# Patient Record
Sex: MALE | Race: WHITE | NOT HISPANIC OR LATINO | Employment: FULL TIME | URBAN - METROPOLITAN AREA
[De-identification: names, ages, dates, MRNs, and addresses within clinical notes are randomized per-mention and may not be internally consistent; named-entity substitution may affect disease eponyms.]

---

## 2018-10-09 ENCOUNTER — OFFICE VISIT CONVERTED (OUTPATIENT)
Dept: FAMILY MEDICINE CLINIC | Facility: CLINIC | Age: 43
End: 2018-10-09
Attending: NURSE PRACTITIONER

## 2019-10-16 ENCOUNTER — CONVERSION ENCOUNTER (OUTPATIENT)
Dept: OTHER | Facility: HOSPITAL | Age: 44
End: 2019-10-16

## 2019-10-16 ENCOUNTER — HOSPITAL ENCOUNTER (OUTPATIENT)
Dept: FAMILY MEDICINE CLINIC | Facility: CLINIC | Age: 44
Discharge: HOME | End: 2019-10-16
Attending: NURSE PRACTITIONER

## 2019-10-16 ENCOUNTER — OFFICE VISIT CONVERTED (OUTPATIENT)
Dept: FAMILY MEDICINE CLINIC | Facility: CLINIC | Age: 44
End: 2019-10-16
Attending: NURSE PRACTITIONER

## 2019-10-16 LAB
25(OH)D3 SERPL-MCNC: 20.5 NG/ML (ref 30–100)
ALBUMIN SERPL-MCNC: 4.3 G/DL (ref 3.5–5)
ALBUMIN/GLOB SERPL: 1.3 {RATIO} (ref 1.4–2.6)
ALP SERPL-CCNC: 84 U/L (ref 53–128)
ALT SERPL-CCNC: 17 U/L (ref 10–40)
ANION GAP SERPL CALC-SCNC: 18 MMOL/L (ref 8–19)
AST SERPL-CCNC: 20 U/L (ref 15–50)
BASOPHILS # BLD AUTO: 0.03 10*3/UL (ref 0–0.2)
BASOPHILS NFR BLD AUTO: 0.5 % (ref 0–3)
BILIRUB SERPL-MCNC: 0.52 MG/DL (ref 0.2–1.3)
BUN SERPL-MCNC: 14 MG/DL (ref 5–25)
BUN/CREAT SERPL: 12 {RATIO} (ref 6–20)
CALCIUM SERPL-MCNC: 9.8 MG/DL (ref 8.7–10.4)
CHLORIDE SERPL-SCNC: 103 MMOL/L (ref 99–111)
CHOLEST SERPL-MCNC: 191 MG/DL (ref 107–200)
CHOLEST/HDLC SERPL: 4.5 {RATIO} (ref 3–6)
CONV ABS IMM GRAN: 0.03 10*3/UL (ref 0–0.2)
CONV CO2: 25 MMOL/L (ref 22–32)
CONV IMMATURE GRAN: 0.5 % (ref 0–1.8)
CONV TOTAL PROTEIN: 7.5 G/DL (ref 6.3–8.2)
CREAT UR-MCNC: 1.21 MG/DL (ref 0.7–1.2)
DEPRECATED RDW RBC AUTO: 39.3 FL (ref 35.1–43.9)
EOSINOPHIL # BLD AUTO: 0.32 10*3/UL (ref 0–0.7)
EOSINOPHIL # BLD AUTO: 5.1 % (ref 0–7)
ERYTHROCYTE [DISTWIDTH] IN BLOOD BY AUTOMATED COUNT: 12.6 % (ref 11.6–14.4)
GFR SERPLBLD BASED ON 1.73 SQ M-ARVRAT: >60 ML/MIN/{1.73_M2}
GLOBULIN UR ELPH-MCNC: 3.2 G/DL (ref 2–3.5)
GLUCOSE SERPL-MCNC: 95 MG/DL (ref 70–99)
HCT VFR BLD AUTO: 47.1 % (ref 42–52)
HDLC SERPL-MCNC: 42 MG/DL (ref 40–60)
HGB BLD-MCNC: 16 G/DL (ref 14–18)
LDLC SERPL CALC-MCNC: 134 MG/DL (ref 70–100)
LYMPHOCYTES # BLD AUTO: 1.74 10*3/UL (ref 1–5)
LYMPHOCYTES NFR BLD AUTO: 28 % (ref 20–45)
MCH RBC QN AUTO: 29.1 PG (ref 27–31)
MCHC RBC AUTO-ENTMCNC: 34 G/DL (ref 33–37)
MCV RBC AUTO: 85.8 FL (ref 80–96)
MONOCYTES # BLD AUTO: 0.41 10*3/UL (ref 0.2–1.2)
MONOCYTES NFR BLD AUTO: 6.6 % (ref 3–10)
NEUTROPHILS # BLD AUTO: 3.69 10*3/UL (ref 2–8)
NEUTROPHILS NFR BLD AUTO: 59.3 % (ref 30–85)
NRBC CBCN: 0 % (ref 0–0.7)
OSMOLALITY SERPL CALC.SUM OF ELEC: 294 MOSM/KG (ref 273–304)
PLATELET # BLD AUTO: 320 10*3/UL (ref 130–400)
PMV BLD AUTO: 9.3 FL (ref 9.4–12.4)
POTASSIUM SERPL-SCNC: 4.4 MMOL/L (ref 3.5–5.3)
RBC # BLD AUTO: 5.49 10*6/UL (ref 4.7–6.1)
SODIUM SERPL-SCNC: 142 MMOL/L (ref 135–147)
TRIGL SERPL-MCNC: 73 MG/DL (ref 40–150)
TSH SERPL-ACNC: 3.26 M[IU]/L (ref 0.27–4.2)
VLDLC SERPL-MCNC: 15 MG/DL (ref 5–37)
WBC # BLD AUTO: 6.22 10*3/UL (ref 4.8–10.8)

## 2020-08-19 ENCOUNTER — HOSPITAL ENCOUNTER (OUTPATIENT)
Dept: LAB | Facility: HOSPITAL | Age: 45
Discharge: HOME | End: 2020-08-19
Attending: NURSE PRACTITIONER

## 2020-08-19 ENCOUNTER — OFFICE VISIT CONVERTED (OUTPATIENT)
Dept: FAMILY MEDICINE CLINIC | Facility: CLINIC | Age: 45
End: 2020-08-19
Attending: NURSE PRACTITIONER

## 2020-08-19 LAB — 25(OH)D3 SERPL-MCNC: 26.8 NG/ML (ref 30–100)

## 2020-08-20 LAB
ALBUMIN SERPL-MCNC: 4.2 G/DL (ref 3.5–5)
ALBUMIN/GLOB SERPL: 1.4 {RATIO} (ref 1.4–2.6)
ALP SERPL-CCNC: 77 U/L (ref 53–128)
ALT SERPL-CCNC: 17 U/L (ref 10–40)
ANION GAP SERPL CALC-SCNC: 20 MMOL/L (ref 8–19)
AST SERPL-CCNC: 20 U/L (ref 15–50)
BASOPHILS # BLD AUTO: 0.03 10*3/UL (ref 0–0.2)
BASOPHILS NFR BLD AUTO: 0.4 % (ref 0–3)
BILIRUB SERPL-MCNC: 0.61 MG/DL (ref 0.2–1.3)
BUN SERPL-MCNC: 15 MG/DL (ref 5–25)
BUN/CREAT SERPL: 12 {RATIO} (ref 6–20)
CALCIUM SERPL-MCNC: 9.9 MG/DL (ref 8.7–10.4)
CHLORIDE SERPL-SCNC: 104 MMOL/L (ref 99–111)
CHOLEST SERPL-MCNC: 186 MG/DL (ref 107–200)
CHOLEST/HDLC SERPL: 4.2 {RATIO} (ref 3–6)
CONV ABS IMM GRAN: 0.01 10*3/UL (ref 0–0.2)
CONV CO2: 23 MMOL/L (ref 22–32)
CONV IMMATURE GRAN: 0.1 % (ref 0–1.8)
CONV TOTAL PROTEIN: 7.1 G/DL (ref 6.3–8.2)
CREAT UR-MCNC: 1.28 MG/DL (ref 0.7–1.2)
DEPRECATED RDW RBC AUTO: 40.8 FL (ref 35.1–43.9)
EOSINOPHIL # BLD AUTO: 0.45 10*3/UL (ref 0–0.7)
EOSINOPHIL # BLD AUTO: 5.8 % (ref 0–7)
ERYTHROCYTE [DISTWIDTH] IN BLOOD BY AUTOMATED COUNT: 13 % (ref 11.6–14.4)
GFR SERPLBLD BASED ON 1.73 SQ M-ARVRAT: >60 ML/MIN/{1.73_M2}
GLOBULIN UR ELPH-MCNC: 2.9 G/DL (ref 2–3.5)
GLUCOSE SERPL-MCNC: 94 MG/DL (ref 70–99)
HCT VFR BLD AUTO: 43.9 % (ref 42–52)
HDLC SERPL-MCNC: 44 MG/DL (ref 40–60)
HGB BLD-MCNC: 14.7 G/DL (ref 14–18)
LDLC SERPL CALC-MCNC: 133 MG/DL (ref 70–100)
LYMPHOCYTES # BLD AUTO: 1.72 10*3/UL (ref 1–5)
LYMPHOCYTES NFR BLD AUTO: 22.3 % (ref 20–45)
MCH RBC QN AUTO: 29.1 PG (ref 27–31)
MCHC RBC AUTO-ENTMCNC: 33.5 G/DL (ref 33–37)
MCV RBC AUTO: 86.9 FL (ref 80–96)
MONOCYTES # BLD AUTO: 0.43 10*3/UL (ref 0.2–1.2)
MONOCYTES NFR BLD AUTO: 5.6 % (ref 3–10)
NEUTROPHILS # BLD AUTO: 5.06 10*3/UL (ref 2–8)
NEUTROPHILS NFR BLD AUTO: 65.8 % (ref 30–85)
NRBC CBCN: 0 % (ref 0–0.7)
OSMOLALITY SERPL CALC.SUM OF ELEC: 297 MOSM/KG (ref 273–304)
PLATELET # BLD AUTO: 304 10*3/UL (ref 130–400)
PMV BLD AUTO: 9.3 FL (ref 9.4–12.4)
POTASSIUM SERPL-SCNC: 4.2 MMOL/L (ref 3.5–5.3)
PSA SERPL-MCNC: 0.9 NG/ML (ref 0–4)
RBC # BLD AUTO: 5.05 10*6/UL (ref 4.7–6.1)
SODIUM SERPL-SCNC: 143 MMOL/L (ref 135–147)
TRIGL SERPL-MCNC: 45 MG/DL (ref 40–150)
TSH SERPL-ACNC: 2.41 M[IU]/L (ref 0.27–4.2)
VLDLC SERPL-MCNC: 9 MG/DL (ref 5–37)
WBC # BLD AUTO: 7.7 10*3/UL (ref 4.8–10.8)

## 2021-05-13 NOTE — PROGRESS NOTES
Progress Note      Patient Name: Morris Nichols   Patient ID: [id number]   Sex: Male   YOB: 1975    Primary Care Provider: Madhavi GOMES    Visit Date: 2020    Provider: MIREYA Lee   Location: [facility]   Location Address: [address] [place] [address]  [id number]   Location Phone: [phone]          Chief Complaint  · Annual Physical Exam  · Routine labs  · Medication refills      History Of Present Illness  Morris Nichols is a 45 year old /White male who presents for evaluation and treatment of:      Annual physical exam    follow up vitamin d deficiency, hyperlipidemia, and erectile dysfunction   Routine labs- fasting this morning    Medication refills- would like scripts printed out    nonsmoker  no etoh  no regular exercise   at [facility]     colonscopy age 41 recommended repeat in 5 years  father colon cancer age 53  and prostate cancer       Past Medical History  Disease Name Date Onset Notes   Allergies --  --    Anxiety --  --    Arthritis --  --    Depression --  --    Hernia --  --          Past Surgical History  Procedure Name Date Notes   Inguinal Hernia Repair --  --    Vasectomy --  --          Medication List  Name Date Started Instructions   Viagra 100 mg oral tablet 2020 take 1 tablet (100 mg) by oral route once daily as needed approximately 1 hour before sexual activity for 30 days   Vitamin D2 1,250 mcg (50,000 unit) oral capsule 2020 take 1 capsule (50,000 unit) by oral route once weekly for 90 days         Allergy List  Allergen Name Date Reaction Notes   Mobic --  --  --        Allergies Reconciled  Family Medical History  Disease Name Relative/Age Notes   Stroke  --    Heart Disease  --    Cancer, Unspecified  --          Social History  Finding Status Start/Stop Quantity Notes   Tobacco Former --/-- --  --          Immunizations  NameDate Admin Mfg Trade Name Lot Number  "Route Inj VIS Given VIS Publication   Elignzybo10/16/2019 PMC Fluzone Quadrivalent [id number] IM LD 10/16/2019    Comments: NDC [id number]   Hthnoptqs68/09/2018 PMC Fluzone Quadrivalent [id number] IM LD 10/09/2018 08/07/2015   Comments: Pt tolerated well   Tdap09/27/2019 SKB BOOSTRIX [id number] IM RD 09/27/2019    Comments: pt tolerated well. NDC: 46401-667-71         Review of Systems  · Constitutional  o Denies  o : fever, chills  · Eyes  o Denies  o : changes in vision  · HENT  o Denies  o : headaches  · Cardiovascular  o Denies  o : chest pain  · Respiratory  o Denies  o : cough  · Gastrointestinal  o Denies  o : nausea, vomiting, diarrhea, constipation, abdominal pain, blood in stools  · Genitourinary  o Denies  o : dysuria  · Musculoskeletal  o Denies  o : joint pain, joint swelling, muscle pain      Vitals  Date Time BP Position Site L\R Cuff Size HR RR TEMP (F) WT  HT  BMI kg/m2 BSA m2 O2 Sat        10/16/2019 10:52 /88 Sitting    69 - R   209lbs 2oz 5'  7.25\" 32.51 2.12 100 %    10/16/2019 11:30 /88 Sitting               08/19/2020 10:50 /87 Sitting    74 - R  98.2 203lbs 4oz 5'  7.25\" 31.6 2.09 98 %          Physical Examination  · Constitutional  o Appearance  o : well-nourished, in no acute distress  · Eyes  o Conjunctivae  o : conjunctivae normal  o Sclerae  o : sclerae white  o Pupils and Irises  o : pupils equal and round  o Eyelids/Ocular Adnexae  o : eyelid appearance normal, no exudates present  · Ears, Nose, Mouth and Throat  o Ears  o :   § External Ears  § : external auditory canal appearance within normal limits  § Otoscopic Examination  § : tympanic membrane appearance within normal limits bilaterally  o Nose  o :   § External Nose  § : appearance normal  § Intranasal Exam  § : mucosa within normal limits  § Nasopharynx  § : no discharge present  o Oral Cavity  o :   § Oral Mucosa  § : oral mucosa normal  § Lips  § : lip appearance normal  § Teeth  § : normal dentation for " age  o Throat  o :   § Oropharynx  § : no inflammation or lesions present, tonsils within normal limits  · Neck  o Inspection/Palpation  o : normal appearance, no masses or tenderness, trachea midline  o Thyroid  o : gland size normal, nontender  · Respiratory  o Respiratory Effort  o : breathing unlabored  o Inspection of Chest  o : normal appearance  o Auscultation of Lungs  o : normal breath sounds throughout inspiration and expiration  · Cardiovascular  o Heart  o :   § Auscultation of Heart  § : regular rate and rhythm, no murmurs  o Peripheral Vascular System  o :   § Carotid Arteries  § : no bruits present  § Pedal Pulses  § : pulses 2 bilaterally  § Extremities  § : no clubbing or edema  · Gastrointestinal  o Abdominal Examination  o : abdomen nontender to palpation, bowel sounds present   · Lymphatic  o Neck  o : no lymphadenopathy present  · Skin and Subcutaneous Tissue  o General Inspection  o : pink, warm, dry   · Neurologic  o Mental Status Examination  o :   § Orientation  § : grossly oriented to person, place and time  o Gait and Station  o : normal gait, able to stand without difficulty  · Psychiatric  o Judgement and Insight  o : judgment and insight intact  o Mood and Affect  o : mood normal, affect appropriate          Assessment  · Annual physical exam     V70.0/Z00.00  · Hyperlipidemia     272.4/E78.5  · Other long term (current) drug therapy     V58.69/Z79.899  · Vitamin D deficiency     268.9/E55.9  · Screening for prostate cancer     V76.44/Z12.5  · Erectile dysfunction     607.84/N52.9    Problems Reconciled  Plan  · Orders  o Male Physical Primary Care Panel (CMP, CBC, TSH, Lipid, PSA) [facility] (20626, 42142, 06006, 10464, 99947, ) - V70.0/Z00.00, V76.44/Z12.5 - 08/19/2020  o Vitamin D (25-Hydroxy) Level (17845) - 268.9/E55.9 - 08/19/2020  o ACO-39: Current medications updated and reviewed () - - 08/19/2020  · Medications  o Viagra 100 mg oral tablet   SIG: take 1 tablet (100 mg) by  oral route once daily as needed approximately 1 hour before sexual activity for 30 days   DISP: (30) tablets with 5 refills  Refilled on 08/19/2020     o Vitamin D2 50,000 unit oral capsule   SIG: take 1 capsule (50,000 unit) by oral route once weekly for 90 days   DISP: (13) capsules with 1 refills  Refilled on 08/19/2020     o Medications have been Reconciled  o Transition of Care or Provider Policy  · Instructions  o Reviewed health maintenance flowsheet and updated information. Orders were placed and/or patient's response was documented.  o Patient was educated/instructed on their diagnosis, treatment and medications prior to discharge from the clinic today.  · Disposition  o will contact with diagnostics results  o FOLLOW UP PENDING RESULTS            Electronically Signed by: MIREYA Lee -Author on August 19, 2020 11:22:38 AM

## 2021-05-15 VITALS
HEIGHT: 67 IN | BODY MASS INDEX: 31.9 KG/M2 | OXYGEN SATURATION: 98 % | HEART RATE: 74 BPM | SYSTOLIC BLOOD PRESSURE: 138 MMHG | WEIGHT: 203.25 LBS | TEMPERATURE: 98.2 F | DIASTOLIC BLOOD PRESSURE: 87 MMHG

## 2021-05-15 VITALS
HEIGHT: 67 IN | OXYGEN SATURATION: 100 % | DIASTOLIC BLOOD PRESSURE: 88 MMHG | SYSTOLIC BLOOD PRESSURE: 142 MMHG | BODY MASS INDEX: 32.82 KG/M2 | HEART RATE: 69 BPM | WEIGHT: 209.12 LBS

## 2021-05-15 VITALS — DIASTOLIC BLOOD PRESSURE: 88 MMHG | SYSTOLIC BLOOD PRESSURE: 138 MMHG

## 2021-05-16 VITALS
DIASTOLIC BLOOD PRESSURE: 83 MMHG | HEART RATE: 98 BPM | TEMPERATURE: 98.1 F | RESPIRATION RATE: 19 BRPM | WEIGHT: 216 LBS | BODY MASS INDEX: 33.9 KG/M2 | SYSTOLIC BLOOD PRESSURE: 153 MMHG | OXYGEN SATURATION: 98 % | HEIGHT: 67 IN

## 2021-06-24 ENCOUNTER — OFFICE VISIT (OUTPATIENT)
Dept: FAMILY MEDICINE CLINIC | Facility: CLINIC | Age: 46
End: 2021-06-24

## 2021-06-24 VITALS
OXYGEN SATURATION: 99 % | TEMPERATURE: 96.7 F | BODY MASS INDEX: 31.23 KG/M2 | HEIGHT: 67 IN | HEART RATE: 75 BPM | DIASTOLIC BLOOD PRESSURE: 86 MMHG | WEIGHT: 199 LBS | SYSTOLIC BLOOD PRESSURE: 126 MMHG

## 2021-06-24 DIAGNOSIS — N52.9 ERECTILE DYSFUNCTION, UNSPECIFIED ERECTILE DYSFUNCTION TYPE: ICD-10-CM

## 2021-06-24 DIAGNOSIS — Z80.0 FAMILY HISTORY OF COLON CANCER: ICD-10-CM

## 2021-06-24 DIAGNOSIS — E78.5 HYPERLIPIDEMIA, UNSPECIFIED HYPERLIPIDEMIA TYPE: Primary | ICD-10-CM

## 2021-06-24 DIAGNOSIS — Z12.11 SCREENING FOR COLON CANCER: ICD-10-CM

## 2021-06-24 DIAGNOSIS — E55.9 VITAMIN D DEFICIENCY: ICD-10-CM

## 2021-06-24 PROBLEM — F41.9 ANXIETY: Status: ACTIVE | Noted: 2021-06-24

## 2021-06-24 PROBLEM — J01.80 OTHER ACUTE SINUSITIS: Status: ACTIVE | Noted: 2021-06-24

## 2021-06-24 PROBLEM — K46.9 ABDOMINAL HERNIA: Status: ACTIVE | Noted: 2021-06-24

## 2021-06-24 PROBLEM — F32.A DEPRESSION: Status: ACTIVE | Noted: 2021-06-24

## 2021-06-24 PROBLEM — M19.90 ARTHRITIS: Status: ACTIVE | Noted: 2021-06-24

## 2021-06-24 LAB
ALBUMIN SERPL-MCNC: 4.6 G/DL (ref 3.5–5.2)
ALBUMIN/GLOB SERPL: 1.6 G/DL
ALP SERPL-CCNC: 92 U/L (ref 39–117)
ALT SERPL W P-5'-P-CCNC: 13 U/L (ref 1–41)
ANION GAP SERPL CALCULATED.3IONS-SCNC: 8.4 MMOL/L (ref 5–15)
AST SERPL-CCNC: 19 U/L (ref 1–40)
BASOPHILS # BLD AUTO: 0.04 10*3/MM3 (ref 0–0.2)
BASOPHILS NFR BLD AUTO: 0.7 % (ref 0–1.5)
BILIRUB SERPL-MCNC: 0.7 MG/DL (ref 0–1.2)
BUN SERPL-MCNC: 10 MG/DL (ref 6–20)
BUN/CREAT SERPL: 8.1 (ref 7–25)
CALCIUM SPEC-SCNC: 9.3 MG/DL (ref 8.6–10.5)
CHLORIDE SERPL-SCNC: 105 MMOL/L (ref 98–107)
CHOLEST SERPL-MCNC: 160 MG/DL (ref 0–200)
CO2 SERPL-SCNC: 26.6 MMOL/L (ref 22–29)
CREAT SERPL-MCNC: 1.24 MG/DL (ref 0.76–1.27)
DEPRECATED RDW RBC AUTO: 40.1 FL (ref 37–54)
EOSINOPHIL # BLD AUTO: 0.39 10*3/MM3 (ref 0–0.4)
EOSINOPHIL NFR BLD AUTO: 6.8 % (ref 0.3–6.2)
ERYTHROCYTE [DISTWIDTH] IN BLOOD BY AUTOMATED COUNT: 13 % (ref 12.3–15.4)
GFR SERPL CREATININE-BSD FRML MDRD: 63 ML/MIN/1.73
GLOBULIN UR ELPH-MCNC: 2.9 GM/DL
GLUCOSE SERPL-MCNC: 98 MG/DL (ref 65–99)
HCT VFR BLD AUTO: 46.5 % (ref 37.5–51)
HDLC SERPL-MCNC: 40 MG/DL (ref 40–60)
HGB BLD-MCNC: 15.6 G/DL (ref 13–17.7)
IMM GRANULOCYTES # BLD AUTO: 0.01 10*3/MM3 (ref 0–0.05)
IMM GRANULOCYTES NFR BLD AUTO: 0.2 % (ref 0–0.5)
LDLC SERPL CALC-MCNC: 106 MG/DL (ref 0–100)
LDLC/HDLC SERPL: 2.64 {RATIO}
LYMPHOCYTES # BLD AUTO: 1.64 10*3/MM3 (ref 0.7–3.1)
LYMPHOCYTES NFR BLD AUTO: 28.6 % (ref 19.6–45.3)
MCH RBC QN AUTO: 28.9 PG (ref 26.6–33)
MCHC RBC AUTO-ENTMCNC: 33.5 G/DL (ref 31.5–35.7)
MCV RBC AUTO: 86.1 FL (ref 79–97)
MONOCYTES # BLD AUTO: 0.43 10*3/MM3 (ref 0.1–0.9)
MONOCYTES NFR BLD AUTO: 7.5 % (ref 5–12)
NEUTROPHILS NFR BLD AUTO: 3.23 10*3/MM3 (ref 1.7–7)
NEUTROPHILS NFR BLD AUTO: 56.2 % (ref 42.7–76)
NRBC BLD AUTO-RTO: 0 /100 WBC (ref 0–0.2)
PLATELET # BLD AUTO: 336 10*3/MM3 (ref 140–450)
PMV BLD AUTO: 9.4 FL (ref 6–12)
POTASSIUM SERPL-SCNC: 4.6 MMOL/L (ref 3.5–5.2)
PROT SERPL-MCNC: 7.5 G/DL (ref 6–8.5)
RBC # BLD AUTO: 5.4 10*6/MM3 (ref 4.14–5.8)
SODIUM SERPL-SCNC: 140 MMOL/L (ref 136–145)
TRIGL SERPL-MCNC: 72 MG/DL (ref 0–150)
TSH SERPL DL<=0.05 MIU/L-ACNC: 2.7 UIU/ML (ref 0.27–4.2)
VLDLC SERPL-MCNC: 14 MG/DL (ref 5–40)
WBC # BLD AUTO: 5.74 10*3/MM3 (ref 3.4–10.8)

## 2021-06-24 PROCEDURE — 85025 COMPLETE CBC W/AUTO DIFF WBC: CPT | Performed by: NURSE PRACTITIONER

## 2021-06-24 PROCEDURE — 80053 COMPREHEN METABOLIC PANEL: CPT | Performed by: NURSE PRACTITIONER

## 2021-06-24 PROCEDURE — 36415 COLL VENOUS BLD VENIPUNCTURE: CPT | Performed by: NURSE PRACTITIONER

## 2021-06-24 PROCEDURE — 80061 LIPID PANEL: CPT | Performed by: NURSE PRACTITIONER

## 2021-06-24 PROCEDURE — 84443 ASSAY THYROID STIM HORMONE: CPT | Performed by: NURSE PRACTITIONER

## 2021-06-24 PROCEDURE — 99214 OFFICE O/P EST MOD 30 MIN: CPT | Performed by: NURSE PRACTITIONER

## 2021-06-24 RX ORDER — ERGOCALCIFEROL 1.25 MG/1
50000 CAPSULE ORAL WEEKLY
COMMUNITY
End: 2021-06-24

## 2021-06-24 RX ORDER — SILDENAFIL 100 MG/1
100 TABLET, FILM COATED ORAL AS NEEDED
Qty: 30 TABLET | Refills: 1 | Status: SHIPPED | OUTPATIENT
Start: 2021-06-24 | End: 2021-06-24 | Stop reason: SDUPTHER

## 2021-06-24 RX ORDER — SILDENAFIL 100 MG/1
100 TABLET, FILM COATED ORAL AS NEEDED
Qty: 30 TABLET | Refills: 1 | Status: SHIPPED | OUTPATIENT
Start: 2021-06-24 | End: 2022-06-30 | Stop reason: SDUPTHER

## 2021-06-24 NOTE — PROGRESS NOTES
Follow Up Office Visit      Patient Name: Morris Nichols  : 1975   MRN: [id number]     Chief Complaint:    Chief Complaint   Patient presents with   • Med Refill   • Follow-up   • Hyperlipidemia   • Erectile Dysfunction   • Vitamin D Deficiency       History of Present Illness: Morris Nichols is a 46 y.o. male who is here today to follow up for   Hyperlipidemia, vitamin d deficiency, and erectile dysfunction   F/U-Med refills,labs     Colonscopy- 5 years ago due for one father passed from colon cancer   Labs-   Nonsmoker  No etoh  No regular exercise   at [facility]     Subjective      Review of Systems:   Review of Systems   Constitutional: Negative for chills and fever.   HENT: Negative for postnasal drip, rhinorrhea and sore throat.    Eyes: Negative for discharge.   Respiratory: Negative for cough and shortness of breath.    Cardiovascular: Negative for chest pain and leg swelling.   Gastrointestinal: Negative for abdominal pain, constipation, diarrhea, nausea and vomiting.   Genitourinary: Negative for dysuria.   Musculoskeletal: Negative for myalgias.   Skin: Negative for rash.        Past Medical History:   Past Medical History:   Diagnosis Date   • Allergies    • Anxiety    • Arthritis    • Depression    • H/O hernia repair        Past Surgical History:   Past Surgical History:   Procedure Laterality Date   • INGUINAL HERNIA REPAIR     • VESICOSTOMY         Family History:   Family History   Problem Relation Age of Onset   • Stroke Other    • Heart disease Other    • Cancer Other         unspecified       Social History:   Social History     Socioeconomic History   • Marital status:      Spouse name: Not on file   • Number of children: Not on file   • Years of education: Not on file   • Highest education level: Not on file   Tobacco Use   • Smoking status: Former Smoker   • Smokeless tobacco: Never Used   Substance and Sexual Activity   • Alcohol use: Never   • Drug use: Never  "  • Sexual activity: Defer       Medications:     Current Outpatient Medications:   •  sildenafil (VIAGRA) 100 MG tablet, Take 1 tablet by mouth As Needed for Erectile Dysfunction., Disp: 30 tablet, Rfl: 1  •  Cholecalciferol 50 MCG (2000 UT) tablet, Take 2,000 Units by mouth Daily., Disp: 90 each, Rfl: 1    Allergies:   Allergies   Allergen Reactions   • Meloxicam Unknown - High Severity           PHQ-2 Total Score: 0   PHQ-9 Total Score: 0     Objective     Physical Exam:  Vital Signs:   Vitals:    06/24/21 0856   BP: 126/86   Pulse: 75   Temp: 96.7 °F (35.9 °C)   SpO2: 99%   Weight: 90.3 kg (199 lb)   Height: 170.2 cm (67\")     Body mass index is 31.17 kg/m².     Physical Exam  Constitutional:       Appearance: Normal appearance.   HENT:      Head: Normocephalic.      Right Ear: Tympanic membrane normal.      Left Ear: Tympanic membrane normal.      Nose: Nose normal.      Mouth/Throat:      Mouth: Mucous membranes are moist.      Pharynx: Oropharynx is clear.   Eyes:      Conjunctiva/sclera: Conjunctivae normal.      Pupils: Pupils are equal, round, and reactive to light.   Neck:      Vascular: No carotid bruit.   Cardiovascular:      Rate and Rhythm: Normal rate and regular rhythm.      Heart sounds: Normal heart sounds. No murmur heard.     Pulmonary:      Effort: Pulmonary effort is normal.      Breath sounds: Normal breath sounds.   Abdominal:      General: Abdomen is flat. Bowel sounds are normal.      Palpations: Abdomen is soft.   Musculoskeletal:      Cervical back: Neck supple.      Right lower leg: No edema.      Left lower leg: No edema.   Neurological:      Mental Status: He is alert and oriented to person, place, and time.      Gait: Gait normal.   Psychiatric:         Mood and Affect: Mood normal.         Behavior: Behavior normal.             Assessment / Plan      Assessment/Plan:   Diagnoses and all orders for this visit:    1. Hyperlipidemia, unspecified hyperlipidemia type (Primary)  -     " "Comprehensive Metabolic Panel  -     CBC Auto Differential  -     Lipid Panel; Future  -     TSH    2. Screening for colon cancer  -     Ambulatory Referral For Screening Colonoscopy    3. Erectile dysfunction, unspecified erectile dysfunction type    4. Vitamin D deficiency    5. Family history of colon cancer  -     Ambulatory Referral For Screening Colonoscopy    Other orders  -     sildenafil (VIAGRA) 100 MG tablet; Take 1 tablet by mouth As Needed for Erectile Dysfunction.  Dispense: 30 tablet; Refill: 1  -     Cholecalciferol 50 MCG (2000 UT) tablet; Take 2,000 Units by mouth Daily.  Dispense: 90 each; Refill: 1       Hyperlipidemia will obtain lipid panel to monitor  ED stable on prn use orf viagra  Vitamin d deficiency recommend daily supplementation  Recommend daily exercise       Follow Up:   Return in about 1 year (around 6/24/2022).    Irving Uriostegui, APRAI    \"Please note that portions of this note were completed with a voice recognition program.\"    "

## 2021-07-09 ENCOUNTER — PREP FOR SURGERY (OUTPATIENT)
Dept: OTHER | Facility: HOSPITAL | Age: 46
End: 2021-07-09

## 2021-07-09 ENCOUNTER — CLINICAL SUPPORT (OUTPATIENT)
Dept: GASTROENTEROLOGY | Facility: CLINIC | Age: 46
End: 2021-07-09

## 2021-07-09 DIAGNOSIS — Z12.11 SCREEN FOR COLON CANCER: Primary | ICD-10-CM

## 2021-07-09 RX ORDER — MELATONIN
2000 DAILY
COMMUNITY
Start: 2021-06-24 | End: 2022-06-30 | Stop reason: SDUPTHER

## 2021-07-09 RX ORDER — PEG-3350, SODIUM SULFATE, SODIUM CHLORIDE, POTASSIUM CHLORIDE, SODIUM ASCORBATE AND ASCORBIC ACID 7.5-2.691G
1000 KIT ORAL EVERY 12 HOURS
Qty: 1000 ML | Refills: 0 | Status: ON HOLD | OUTPATIENT
Start: 2021-07-09 | End: 2021-12-29

## 2021-08-10 ENCOUNTER — TELEPHONE (OUTPATIENT)
Dept: GASTROENTEROLOGY | Facility: CLINIC | Age: 46
End: 2021-08-10

## 2021-11-03 ENCOUNTER — TELEPHONE (OUTPATIENT)
Dept: GASTROENTEROLOGY | Facility: CLINIC | Age: 46
End: 2021-11-03

## 2021-11-10 ENCOUNTER — TELEPHONE (OUTPATIENT)
Dept: GASTROENTEROLOGY | Facility: CLINIC | Age: 46
End: 2021-11-10

## 2021-11-17 ENCOUNTER — TELEPHONE (OUTPATIENT)
Dept: GASTROENTEROLOGY | Facility: CLINIC | Age: 46
End: 2021-11-17

## 2021-12-14 ENCOUNTER — TELEPHONE (OUTPATIENT)
Dept: GASTROENTEROLOGY | Facility: CLINIC | Age: 46
End: 2021-12-14

## 2021-12-14 NOTE — TELEPHONE ENCOUNTER
"LVM for patient to call our office back. Referrals department said that there is no referral on [employer] website. Sirisha to call MD office to figure out why the Referral is \"incomplete\" on [employer] website.  "

## 2021-12-22 ENCOUNTER — TELEPHONE (OUTPATIENT)
Dept: GASTROENTEROLOGY | Facility: CLINIC | Age: 46
End: 2021-12-22

## 2021-12-22 NOTE — TELEPHONE ENCOUNTER
I have called patient and left a detailed message for an upcoming procedure including date, time and a good callback number for any questions.

## 2021-12-29 ENCOUNTER — ANESTHESIA (OUTPATIENT)
Dept: GASTROENTEROLOGY | Facility: HOSPITAL | Age: 46
End: 2021-12-29

## 2021-12-29 ENCOUNTER — ANESTHESIA EVENT (OUTPATIENT)
Dept: GASTROENTEROLOGY | Facility: HOSPITAL | Age: 46
End: 2021-12-29

## 2021-12-29 ENCOUNTER — HOSPITAL ENCOUNTER (OUTPATIENT)
Facility: HOSPITAL | Age: 46
Setting detail: HOSPITAL OUTPATIENT SURGERY
Discharge: HOME | End: 2021-12-29
Attending: INTERNAL MEDICINE | Admitting: INTERNAL MEDICINE

## 2021-12-29 VITALS
DIASTOLIC BLOOD PRESSURE: 81 MMHG | BODY MASS INDEX: 30.2 KG/M2 | WEIGHT: 199.3 LBS | TEMPERATURE: 97.2 F | OXYGEN SATURATION: 97 % | HEART RATE: 69 BPM | RESPIRATION RATE: 17 BRPM | HEIGHT: 68 IN | SYSTOLIC BLOOD PRESSURE: 117 MMHG

## 2021-12-29 DIAGNOSIS — Z12.11 SCREEN FOR COLON CANCER: ICD-10-CM

## 2021-12-29 PROCEDURE — 45385 COLONOSCOPY W/LESION REMOVAL: CPT | Performed by: INTERNAL MEDICINE

## 2021-12-29 PROCEDURE — 88305 TISSUE EXAM BY PATHOLOGIST: CPT | Performed by: INTERNAL MEDICINE

## 2021-12-29 PROCEDURE — 25010000002 PROPOFOL 10 MG/ML EMULSION: Performed by: NURSE ANESTHETIST, CERTIFIED REGISTERED

## 2021-12-29 RX ORDER — SODIUM CHLORIDE, SODIUM LACTATE, POTASSIUM CHLORIDE, CALCIUM CHLORIDE 600; 310; 30; 20 MG/100ML; MG/100ML; MG/100ML; MG/100ML
30 INJECTION, SOLUTION INTRAVENOUS CONTINUOUS
Status: DISCONTINUED | OUTPATIENT
Start: 2021-12-29 | End: 2021-12-29 | Stop reason: HOSPADM

## 2021-12-29 RX ORDER — LIDOCAINE HYDROCHLORIDE 20 MG/ML
INJECTION, SOLUTION INFILTRATION; PERINEURAL AS NEEDED
Status: DISCONTINUED | OUTPATIENT
Start: 2021-12-29 | End: 2021-12-29 | Stop reason: SURG

## 2021-12-29 RX ADMIN — PROPOFOL 200 MCG/KG/MIN: 10 INJECTION, EMULSION INTRAVENOUS at 08:20

## 2021-12-29 RX ADMIN — LIDOCAINE HYDROCHLORIDE 50 MG: 20 INJECTION, SOLUTION INFILTRATION; PERINEURAL at 08:20

## 2021-12-29 RX ADMIN — SODIUM CHLORIDE, POTASSIUM CHLORIDE, SODIUM LACTATE AND CALCIUM CHLORIDE 30 ML/HR: 600; 310; 30; 20 INJECTION, SOLUTION INTRAVENOUS at 07:21

## 2021-12-29 NOTE — ANESTHESIA POSTPROCEDURE EVALUATION
Patient: Morris Nichols    Procedure Summary     Date: 12/29/21 Room / Location: [facility] ENDOSCOPY 3 / [facility] ENDOSCOPY    Anesthesia Start: 0820 Anesthesia Stop: 0842    Procedure: COLONOSCOPY with polypectomy, snare (N/A ) Diagnosis:       Screen for colon cancer      (Screen for colon cancer [Z12.11])    Surgeons: Cecy Gibson MD Provider: Augusto Hill MD    Anesthesia Type: general ASA Status: 2          Anesthesia Type: general    Vitals  Vitals Value Taken Time   BP 96/66 12/29/21 0848   Temp 36.2 °C (97.2 °F) 12/29/21 0843   Pulse 78 12/29/21 0853   Resp 12 12/29/21 0848   SpO2 96 % 12/29/21 0853   Vitals shown include unvalidated device data.        Post Anesthesia Care and Evaluation    Patient location during evaluation: bedside  Patient participation: complete - patient participated  Level of consciousness: awake  Pain management: adequate  Airway patency: patent  Anesthetic complications: No anesthetic complications  PONV Status: none  Cardiovascular status: acceptable and stable  Respiratory status: acceptable  Hydration status: acceptable    Comments: An Anesthesiologist personally participated in the most demanding procedures (including induction and emergence if applicable) in the anesthesia plan, monitored the course of anesthesia administration at frequent intervals and remained physically present and available for immediate diagnosis and treatment of emergencies.

## 2021-12-29 NOTE — ANESTHESIA PREPROCEDURE EVALUATION
Anesthesia Evaluation     Patient summary reviewed and Nursing notes reviewed   no history of anesthetic complications:  NPO Solid Status: > 8 hours  NPO Liquid Status: > 2 hours           Airway   Mallampati: I  TM distance: >3 FB  Neck ROM: full  No difficulty expected  Dental      Pulmonary - negative pulmonary ROS and normal exam    breath sounds clear to auscultation  Cardiovascular - normal exam  Exercise tolerance: good (4-7 METS)    Rhythm: regular    (+) hyperlipidemia,       Neuro/Psych  (+) psychiatric history Anxiety and Depression,     GI/Hepatic/Renal/Endo - negative ROS     Musculoskeletal     Abdominal    Substance History - negative use     OB/GYN negative ob/gyn ROS         Other   arthritis,                      Anesthesia Plan    ASA 2     general   total IV anesthesia    Anesthetic plan, all risks, benefits, and alternatives have been provided, discussed and informed consent has been obtained with: patient.

## 2021-12-30 LAB
CYTO UR: NORMAL
LAB AP CASE REPORT: NORMAL
LAB AP CLINICAL INFORMATION: NORMAL
PATH REPORT.FINAL DX SPEC: NORMAL
PATH REPORT.GROSS SPEC: NORMAL

## 2022-06-30 ENCOUNTER — OFFICE VISIT (OUTPATIENT)
Dept: FAMILY MEDICINE CLINIC | Facility: CLINIC | Age: 47
End: 2022-06-30

## 2022-06-30 VITALS
SYSTOLIC BLOOD PRESSURE: 136 MMHG | HEART RATE: 74 BPM | HEIGHT: 68 IN | TEMPERATURE: 97.6 F | OXYGEN SATURATION: 100 % | BODY MASS INDEX: 30.31 KG/M2 | WEIGHT: 200 LBS | DIASTOLIC BLOOD PRESSURE: 86 MMHG

## 2022-06-30 DIAGNOSIS — E78.2 MIXED HYPERLIPIDEMIA: ICD-10-CM

## 2022-06-30 DIAGNOSIS — N52.9 ERECTILE DYSFUNCTION, UNSPECIFIED ERECTILE DYSFUNCTION TYPE: ICD-10-CM

## 2022-06-30 DIAGNOSIS — E55.9 VITAMIN D DEFICIENCY: ICD-10-CM

## 2022-06-30 DIAGNOSIS — Z13.29 SCREENING FOR THYROID DISORDER: ICD-10-CM

## 2022-06-30 DIAGNOSIS — Z00.00 ANNUAL PHYSICAL EXAM: ICD-10-CM

## 2022-06-30 LAB
ALBUMIN SERPL-MCNC: 4.5 G/DL (ref 3.5–5.2)
ALBUMIN/GLOB SERPL: 1.8 G/DL
ALP SERPL-CCNC: 93 U/L (ref 39–117)
ALT SERPL W P-5'-P-CCNC: 13 U/L (ref 1–41)
ANION GAP SERPL CALCULATED.3IONS-SCNC: 8.9 MMOL/L (ref 5–15)
AST SERPL-CCNC: 14 U/L (ref 1–40)
BASOPHILS # BLD AUTO: 0.04 10*3/MM3 (ref 0–0.2)
BASOPHILS NFR BLD AUTO: 0.7 % (ref 0–1.5)
BILIRUB SERPL-MCNC: 0.7 MG/DL (ref 0–1.2)
BUN SERPL-MCNC: 14 MG/DL (ref 6–20)
BUN/CREAT SERPL: 11.7 (ref 7–25)
CALCIUM SPEC-SCNC: 9.1 MG/DL (ref 8.6–10.5)
CHLORIDE SERPL-SCNC: 104 MMOL/L (ref 98–107)
CHOLEST SERPL-MCNC: 166 MG/DL (ref 0–200)
CO2 SERPL-SCNC: 26.1 MMOL/L (ref 22–29)
CREAT SERPL-MCNC: 1.2 MG/DL (ref 0.76–1.27)
DEPRECATED RDW RBC AUTO: 39 FL (ref 37–54)
EGFRCR SERPLBLD CKD-EPI 2021: 75.5 ML/MIN/1.73
EOSINOPHIL # BLD AUTO: 0.28 10*3/MM3 (ref 0–0.4)
EOSINOPHIL NFR BLD AUTO: 4.8 % (ref 0.3–6.2)
ERYTHROCYTE [DISTWIDTH] IN BLOOD BY AUTOMATED COUNT: 12.8 % (ref 12.3–15.4)
GLOBULIN UR ELPH-MCNC: 2.5 GM/DL
GLUCOSE SERPL-MCNC: 92 MG/DL (ref 65–99)
HCT VFR BLD AUTO: 43.8 % (ref 37.5–51)
HDLC SERPL-MCNC: 40 MG/DL (ref 40–60)
HGB BLD-MCNC: 15.4 G/DL (ref 13–17.7)
IMM GRANULOCYTES # BLD AUTO: 0.01 10*3/MM3 (ref 0–0.05)
IMM GRANULOCYTES NFR BLD AUTO: 0.2 % (ref 0–0.5)
LDLC SERPL CALC-MCNC: 114 MG/DL (ref 0–100)
LDLC/HDLC SERPL: 2.84 {RATIO}
LYMPHOCYTES # BLD AUTO: 1.65 10*3/MM3 (ref 0.7–3.1)
LYMPHOCYTES NFR BLD AUTO: 28.2 % (ref 19.6–45.3)
MCH RBC QN AUTO: 29.9 PG (ref 26.6–33)
MCHC RBC AUTO-ENTMCNC: 35.2 G/DL (ref 31.5–35.7)
MCV RBC AUTO: 85 FL (ref 79–97)
MONOCYTES # BLD AUTO: 0.36 10*3/MM3 (ref 0.1–0.9)
MONOCYTES NFR BLD AUTO: 6.1 % (ref 5–12)
NEUTROPHILS NFR BLD AUTO: 3.52 10*3/MM3 (ref 1.7–7)
NEUTROPHILS NFR BLD AUTO: 60 % (ref 42.7–76)
NRBC BLD AUTO-RTO: 0 /100 WBC (ref 0–0.2)
PLATELET # BLD AUTO: 328 10*3/MM3 (ref 140–450)
PMV BLD AUTO: 9.1 FL (ref 6–12)
POTASSIUM SERPL-SCNC: 4.3 MMOL/L (ref 3.5–5.2)
PROT SERPL-MCNC: 7 G/DL (ref 6–8.5)
RBC # BLD AUTO: 5.15 10*6/MM3 (ref 4.14–5.8)
SODIUM SERPL-SCNC: 139 MMOL/L (ref 136–145)
TRIGL SERPL-MCNC: 62 MG/DL (ref 0–150)
TSH SERPL DL<=0.05 MIU/L-ACNC: 2.61 UIU/ML (ref 0.27–4.2)
VLDLC SERPL-MCNC: 12 MG/DL (ref 5–40)
WBC NRBC COR # BLD: 5.86 10*3/MM3 (ref 3.4–10.8)

## 2022-06-30 PROCEDURE — 85025 COMPLETE CBC W/AUTO DIFF WBC: CPT | Performed by: NURSE PRACTITIONER

## 2022-06-30 PROCEDURE — 80061 LIPID PANEL: CPT | Performed by: NURSE PRACTITIONER

## 2022-06-30 PROCEDURE — 84443 ASSAY THYROID STIM HORMONE: CPT | Performed by: NURSE PRACTITIONER

## 2022-06-30 PROCEDURE — 99396 PREV VISIT EST AGE 40-64: CPT | Performed by: NURSE PRACTITIONER

## 2022-06-30 PROCEDURE — 36415 COLL VENOUS BLD VENIPUNCTURE: CPT | Performed by: NURSE PRACTITIONER

## 2022-06-30 PROCEDURE — 80053 COMPREHEN METABOLIC PANEL: CPT | Performed by: NURSE PRACTITIONER

## 2022-06-30 RX ORDER — MELATONIN
2000 DAILY
Qty: 180 TABLET | Refills: 3 | Status: SHIPPED | OUTPATIENT
Start: 2022-06-30

## 2022-06-30 RX ORDER — SILDENAFIL 100 MG/1
100 TABLET, FILM COATED ORAL AS NEEDED
Qty: 30 TABLET | Refills: 3 | Status: SHIPPED | OUTPATIENT
Start: 2022-06-30 | End: 2022-06-30 | Stop reason: SDUPTHER

## 2022-06-30 RX ORDER — MELATONIN
2000 DAILY
Qty: 180 TABLET | Refills: 3 | Status: SHIPPED | OUTPATIENT
Start: 2022-06-30 | End: 2022-06-30 | Stop reason: SDUPTHER

## 2022-06-30 RX ORDER — SILDENAFIL 100 MG/1
100 TABLET, FILM COATED ORAL AS NEEDED
Qty: 30 TABLET | Refills: 3 | Status: SHIPPED | OUTPATIENT
Start: 2022-06-30

## 2022-06-30 NOTE — PROGRESS NOTES
Follow Up Office Visit      Patient Name: Morris Nichols  : 1975   MRN: [id number]     Chief Complaint:    Chief Complaint   Patient presents with   • Hyperlipidemia   • Vitamin D Deficiency   • Erectile Dysfunction   • Annual Exam       History of Present Illness: Morris Nichols is a 47 y.o. male who is here today to follow up for annual exam hyperlipidemia, ED, and vitamin d deficiency.    Last labs- 2021  psa- na  Colonoscopy- 2021 repeat in 3-5 years     Nonsmoker  No etoh  Exercise daily approximately 10,000 steps    Last dental   Last eye exam with CDL 20/20      Drinking soda 1-2 per day         Subjective      Review of Systems:   Review of Systems   Constitutional: Negative for fever.   HENT: Negative for ear pain, sinus pain and sore throat.    Eyes: Negative for visual disturbance.   Respiratory: Negative for shortness of breath.    Cardiovascular: Negative for chest pain.   Gastrointestinal: Negative for abdominal pain, diarrhea, nausea and vomiting.   Genitourinary: Negative for dysuria.   Musculoskeletal: Negative for myalgias.   Neurological: Negative for headaches.        Past Medical History:   Past Medical History:   Diagnosis Date   • Allergies    • Anxiety    • Arthritis    • Depression    • H/O hernia repair        Past Surgical History:   Past Surgical History:   Procedure Laterality Date   • COLONOSCOPY     • COLONOSCOPY N/A 2021    Procedure: COLONOSCOPY with polypectomy, snare;  Surgeon: Cecy Gibson MD;  Location: [facility] ENDOSCOPY;  Service: Gastroenterology;  Laterality: N/A;  colon polyps,hemorrhoids   • INGUINAL HERNIA REPAIR     • VESICOSTOMY         Family History:   Family History   Problem Relation Age of Onset   • Stroke Other    • Heart disease Other    • Cancer Other         unspecified   • Colon cancer Father    • Malig Hyperthermia Neg Hx        Social History:   Social History     Socioeconomic History   • Marital status:   "  Tobacco Use   • Smoking status: Former Smoker   • Smokeless tobacco: Never Used   Vaping Use   • Vaping Use: Never used   Substance and Sexual Activity   • Alcohol use: Yes     Alcohol/week: 1.0 standard drink     Types: 1 Shots of liquor per week     Comment: Rarely    • Drug use: Never   • Sexual activity: Defer       Medications:     Current Outpatient Medications:   •  cholecalciferol (VITAMIN D3) 25 MCG (1000 UT) tablet, Take 2 tablets by mouth Daily., Disp: 180 tablet, Rfl: 3  •  sildenafil (VIAGRA) 100 MG tablet, Take 1 tablet by mouth As Needed for Erectile Dysfunction., Disp: 30 tablet, Rfl: 3    Allergies:   Allergies   Allergen Reactions   • Meloxicam Palpitations           Objective     Physical Exam:  Vital Signs:   Vitals:    06/30/22 0859 06/30/22 0922   BP: 148/88 136/86   Pulse: 74    Temp: 97.6 °F (36.4 °C)    SpO2: 100%    Weight: 90.7 kg (200 lb)    Height: 172.7 cm (68\")      Body mass index is 30.41 kg/m².     Physical Exam  HENT:      Right Ear: Tympanic membrane normal.      Left Ear: Tympanic membrane normal.      Nose: Nose normal.      Mouth/Throat:      Mouth: Mucous membranes are moist.   Eyes:      Conjunctiva/sclera: Conjunctivae normal.   Neck:      Vascular: No carotid bruit.   Cardiovascular:      Rate and Rhythm: Normal rate and regular rhythm.      Heart sounds: Normal heart sounds. No murmur heard.  Pulmonary:      Effort: Pulmonary effort is normal.      Breath sounds: Normal breath sounds.   Abdominal:      General: Bowel sounds are normal.      Palpations: Abdomen is soft.   Musculoskeletal:      Right lower leg: No edema.      Left lower leg: No edema.   Skin:     General: Skin is warm and dry.   Neurological:      Mental Status: He is alert.      Gait: Gait normal.   Psychiatric:         Mood and Affect: Mood normal.         Behavior: Behavior normal.             Assessment / Plan      Assessment/Plan:   Diagnoses and all orders for this visit:    1. Annual physical " "exam  -     CBC Auto Differential  -     Comprehensive Metabolic Panel    2. Mixed hyperlipidemia  -     Lipid Panel    3. Erectile dysfunction, unspecified erectile dysfunction type    4. Vitamin D deficiency    5. Screening for thyroid disorder  -     TSH    Other orders  -     Discontinue: sildenafil (VIAGRA) 100 MG tablet; Take 1 tablet by mouth As Needed for Erectile Dysfunction.  Dispense: 30 tablet; Refill: 3  -     Discontinue: cholecalciferol (VITAMIN D3) 25 MCG (1000 UT) tablet; Take 2 tablets by mouth Daily.  Dispense: 180 tablet; Refill: 3  -     sildenafil (VIAGRA) 100 MG tablet; Take 1 tablet by mouth As Needed for Erectile Dysfunction.  Dispense: 30 tablet; Refill: 3  -     cholecalciferol (VITAMIN D3) 25 MCG (1000 UT) tablet; Take 2 tablets by mouth Daily.  Dispense: 180 tablet; Refill: 3       Annual physical exam will obtain labs provide a refill on Viagra obtain lipid panel to monitor cholesterol recommend decrease fried foods red meat pork products cheese increase fruits vegetables whole grains continue exercising 30 minutes daily and recommend weight loss        Follow Up:   Return in about 1 year (around 6/30/2023).    Irving Uriostegui, MIREYA    \"Please note that portions of this note were completed with a voice recognition program.\"    "

## 2022-07-05 ENCOUNTER — TELEPHONE (OUTPATIENT)
Dept: FAMILY MEDICINE CLINIC | Facility: CLINIC | Age: 47
End: 2022-07-05

## 2022-07-05 NOTE — TELEPHONE ENCOUNTER
----- Message from MIREYA Torres sent at 7/1/2022  8:31 AM EDT -----  Cholesterol slightly elevated

## 2022-12-09 ENCOUNTER — CLINICAL SUPPORT (OUTPATIENT)
Dept: FAMILY MEDICINE CLINIC | Facility: CLINIC | Age: 47
End: 2022-12-09

## 2022-12-09 DIAGNOSIS — Z23 NEED FOR COVID-19 VACCINE: Primary | ICD-10-CM

## 2022-12-09 PROCEDURE — 0124A PR ADM SARSCOV2 30MCG/0.3ML BST: CPT | Performed by: NURSE PRACTITIONER

## 2022-12-09 PROCEDURE — 91312 COVID-19 (PFIZER) BIVALENT BOOSTER 12+YRS: CPT | Performed by: NURSE PRACTITIONER

## 2023-06-30 PROBLEM — E66.09 CLASS 1 OBESITY DUE TO EXCESS CALORIES WITH SERIOUS COMORBIDITY AND BODY MASS INDEX (BMI) OF 31.0 TO 31.9 IN ADULT: Status: ACTIVE | Noted: 2023-06-30

## 2023-06-30 PROBLEM — E66.811 CLASS 1 OBESITY DUE TO EXCESS CALORIES WITH SERIOUS COMORBIDITY AND BODY MASS INDEX (BMI) OF 31.0 TO 31.9 IN ADULT: Status: ACTIVE | Noted: 2023-06-30

## 2024-06-28 PROBLEM — Z00.00 ANNUAL PHYSICAL EXAM: Status: ACTIVE | Noted: 2024-06-28

## 2024-06-28 NOTE — PROGRESS NOTES
Patient Name: Morris Nichols  : 1975   MRN: [id number]     Chief Complaint:    Chief Complaint   Patient presents with    Anxiety    Depression    Hyperlipidemia    Annual Exam       History of Present Illness: Morris Nichols is a 49 y.o. male who is here today to follow up for annual exam,  Also anxiety, depression, hyperlipidemia, vitamin d deficiency, allergic rhinitis and ED     Labs-2023  Colonoscopy-     Non smoker  No etoh   Last eye exam 2024  Last dental exam 2024  Exercising few days per week     Subjective      Review of Systems:   Review of Systems   Constitutional:  Negative for fever.   HENT:  Negative for ear discharge and sinus pain.    Respiratory:  Negative for cough.    Cardiovascular:  Negative for chest pain.   Gastrointestinal:  Negative for abdominal pain, constipation, diarrhea, nausea and vomiting.   Genitourinary:  Negative for dysuria.   Musculoskeletal:  Negative for myalgias.   Neurological:  Negative for headaches.        Past Medical History:   Past Medical History:   Diagnosis Date    Allergies     Anxiety     Arthritis     Depression     H/O hernia repair        Past Surgical History:   Past Surgical History:   Procedure Laterality Date    COLONOSCOPY      COLONOSCOPY N/A 2021    Procedure: COLONOSCOPY with polypectomy, snare;  Surgeon: Cecy Gibson MD;  Location: [facility] ENDOSCOPY;  Service: Gastroenterology;  Laterality: N/A;  colon polyps,hemorrhoids    INGUINAL HERNIA REPAIR      VESICOSTOMY         Family History:   Family History   Problem Relation Age of Onset    Stroke Other     Heart disease Other     Cancer Other         unspecified    Colon cancer Father     Malig Hyperthermia Neg Hx        Social History:   Social History     Socioeconomic History    Marital status:    Tobacco Use    Smoking status: Former    Smokeless tobacco: Never   Vaping Use    Vaping status: Never Used   Substance and Sexual Activity     "Alcohol use: Yes     Alcohol/week: 1.0 standard drink of alcohol     Types: 1 Shots of liquor per week     Comment: Rarely     Drug use: Never    Sexual activity: Defer       Medications:     Current Outpatient Medications:     cholecalciferol (VITAMIN D3) 25 MCG (1000 UT) tablet, Take 2 tablets by mouth Daily., Disp: 180 tablet, Rfl: 3    sildenafil (VIAGRA) 100 MG tablet, Take 1 tablet by mouth As Needed for Erectile Dysfunction., Disp: 30 tablet, Rfl: 3    Allergies:   Allergies   Allergen Reactions    Meloxicam Palpitations             Objective     Physical Exam:  Vital Signs:   Vitals:    07/02/24 0704   BP: 125/85   Pulse: 76   Temp: 97.8 °F (36.6 °C)   SpO2: 99%   Weight: 96.2 kg (212 lb)   Height: 172.7 cm (68\")     Body mass index is 32.23 kg/m².   BMI is >= 30 and <35. (Class 1 Obesity). The following options were offered after discussion;: exercise counseling/recommendations and nutrition counseling/recommendations       Physical Exam  HENT:      Right Ear: Tympanic membrane normal.      Left Ear: Tympanic membrane normal.      Nose: Nose normal.      Mouth/Throat:      Mouth: Mucous membranes are moist.   Eyes:      Conjunctiva/sclera: Conjunctivae normal.   Neck:      Vascular: No carotid bruit.   Cardiovascular:      Rate and Rhythm: Normal rate and regular rhythm.      Heart sounds: Normal heart sounds. No murmur heard.  Pulmonary:      Effort: Pulmonary effort is normal.      Breath sounds: Normal breath sounds.   Abdominal:      General: Bowel sounds are normal.      Palpations: Abdomen is soft.   Musculoskeletal:      Right lower leg: No edema.      Left lower leg: No edema.   Skin:     General: Skin is warm and dry.   Neurological:      Mental Status: He is alert and oriented to person, place, and time.   Psychiatric:         Mood and Affect: Mood normal.         Behavior: Behavior normal.             Assessment / Plan      Assessment/Plan:   Diagnoses and all orders for this visit:    1. Annual " "physical exam (Primary)    2. Mixed hyperlipidemia  -     Comprehensive Metabolic Panel  -     Lipid Panel    3. Vitamin D deficiency  -     Vitamin D,25-Hydroxy    4. Erectile dysfunction, unspecified erectile dysfunction type    5. Class 1 obesity due to excess calories with serious comorbidity and body mass index (BMI) of 32.0 to 32.9 in adult    Other orders  -     sildenafil (VIAGRA) 100 MG tablet; Take 1 tablet by mouth As Needed for Erectile Dysfunction.  Dispense: 30 tablet; Refill: 3  -     cholecalciferol (VITAMIN D3) 25 MCG (1000 UT) tablet; Take 2 tablets by mouth Daily.  Dispense: 180 tablet; Refill: 3         Annual physical exam screening and immunizations reviewed with patient up-to-date colonoscopy up-to-date recommend exercise 30 minutes daily patient is a non-smoker and does not drink EtOH and obtains routine dental and eye exams  Hyperlipidemia will obtain lipid panel to monitor do recommend reducing fried foods red meat pork products cheese increasing fruits vegetables whole grains this patient has class I obesity patient's insurance does not cover Saxenda Wegovy or zepbound or Contrave  Erectile dysfunction we will provide a refill of Viagra  Vitamin D deficiency recommend 2000 units supplementation daily obtain labs to monitor      Follow Up:   Return in about 1 year (around 7/2/2025).    Irving Uriostegui, MIREYA    \"Please note that portions of this note were completed with a voice recognition program.\"    "

## 2024-07-02 ENCOUNTER — OFFICE VISIT (OUTPATIENT)
Dept: FAMILY MEDICINE CLINIC | Facility: CLINIC | Age: 49
End: 2024-07-02
Payer: OTHER GOVERNMENT

## 2024-07-02 VITALS
DIASTOLIC BLOOD PRESSURE: 85 MMHG | TEMPERATURE: 97.8 F | WEIGHT: 212 LBS | OXYGEN SATURATION: 99 % | SYSTOLIC BLOOD PRESSURE: 125 MMHG | HEART RATE: 76 BPM | HEIGHT: 68 IN | BODY MASS INDEX: 32.13 KG/M2

## 2024-07-02 DIAGNOSIS — N52.9 ERECTILE DYSFUNCTION, UNSPECIFIED ERECTILE DYSFUNCTION TYPE: ICD-10-CM

## 2024-07-02 DIAGNOSIS — E66.09 CLASS 1 OBESITY DUE TO EXCESS CALORIES WITH SERIOUS COMORBIDITY AND BODY MASS INDEX (BMI) OF 32.0 TO 32.9 IN ADULT: ICD-10-CM

## 2024-07-02 DIAGNOSIS — E78.2 MIXED HYPERLIPIDEMIA: ICD-10-CM

## 2024-07-02 DIAGNOSIS — E55.9 VITAMIN D DEFICIENCY: Primary | ICD-10-CM

## 2024-07-02 DIAGNOSIS — Z00.00 ANNUAL PHYSICAL EXAM: Primary | ICD-10-CM

## 2024-07-02 DIAGNOSIS — E55.9 VITAMIN D DEFICIENCY: ICD-10-CM

## 2024-07-02 LAB
25(OH)D3 SERPL-MCNC: 40.8 NG/ML (ref 30–100)
ALBUMIN SERPL-MCNC: 4.5 G/DL (ref 3.5–5.2)
ALBUMIN/GLOB SERPL: 1.5 G/DL
ALP SERPL-CCNC: 91 U/L (ref 39–117)
ALT SERPL W P-5'-P-CCNC: 27 U/L (ref 1–41)
ANION GAP SERPL CALCULATED.3IONS-SCNC: 9 MMOL/L (ref 5–15)
AST SERPL-CCNC: 21 U/L (ref 1–40)
BILIRUB SERPL-MCNC: 0.9 MG/DL (ref 0–1.2)
BUN SERPL-MCNC: 11 MG/DL (ref 6–20)
BUN/CREAT SERPL: 9.9 (ref 7–25)
CALCIUM SPEC-SCNC: 10 MG/DL (ref 8.6–10.5)
CHLORIDE SERPL-SCNC: 103 MMOL/L (ref 98–107)
CHOLEST SERPL-MCNC: 184 MG/DL (ref 0–200)
CO2 SERPL-SCNC: 29 MMOL/L (ref 22–29)
CREAT SERPL-MCNC: 1.11 MG/DL (ref 0.76–1.27)
EGFRCR SERPLBLD CKD-EPI 2021: 81.9 ML/MIN/1.73
GLOBULIN UR ELPH-MCNC: 3.1 GM/DL
GLUCOSE SERPL-MCNC: 95 MG/DL (ref 65–99)
HDLC SERPL-MCNC: 34 MG/DL (ref 40–60)
LDLC SERPL CALC-MCNC: 129 MG/DL (ref 0–100)
LDLC/HDLC SERPL: 3.73 {RATIO}
POTASSIUM SERPL-SCNC: 4.2 MMOL/L (ref 3.5–5.2)
PROT SERPL-MCNC: 7.6 G/DL (ref 6–8.5)
SODIUM SERPL-SCNC: 141 MMOL/L (ref 136–145)
TRIGL SERPL-MCNC: 116 MG/DL (ref 0–150)
VLDLC SERPL-MCNC: 21 MG/DL (ref 5–40)

## 2024-07-02 PROCEDURE — 80053 COMPREHEN METABOLIC PANEL: CPT | Performed by: NURSE PRACTITIONER

## 2024-07-02 PROCEDURE — 80061 LIPID PANEL: CPT | Performed by: NURSE PRACTITIONER

## 2024-07-02 PROCEDURE — 99396 PREV VISIT EST AGE 40-64: CPT | Performed by: NURSE PRACTITIONER

## 2024-07-02 PROCEDURE — 82306 VITAMIN D 25 HYDROXY: CPT | Performed by: NURSE PRACTITIONER

## 2024-07-02 PROCEDURE — 36415 COLL VENOUS BLD VENIPUNCTURE: CPT | Performed by: NURSE PRACTITIONER

## 2024-07-02 RX ORDER — MELATONIN
2000 DAILY
Qty: 180 TABLET | Refills: 3 | Status: SHIPPED | OUTPATIENT
Start: 2024-07-02

## 2024-07-02 RX ORDER — SILDENAFIL 100 MG/1
100 TABLET, FILM COATED ORAL AS NEEDED
Qty: 30 TABLET | Refills: 3 | Status: SHIPPED | OUTPATIENT
Start: 2024-07-02 | End: 2024-07-02 | Stop reason: SDUPTHER

## 2024-07-02 RX ORDER — SILDENAFIL 100 MG/1
100 TABLET, FILM COATED ORAL AS NEEDED
Qty: 30 TABLET | Refills: 3 | Status: SHIPPED | OUTPATIENT
Start: 2024-07-02

## 2024-07-02 RX ORDER — MELATONIN
2000 DAILY
Qty: 180 TABLET | Refills: 3 | Status: SHIPPED | OUTPATIENT
Start: 2024-07-02 | End: 2024-07-02 | Stop reason: SDUPTHER

## 2024-10-01 ENCOUNTER — CLINICAL SUPPORT (OUTPATIENT)
Dept: FAMILY MEDICINE CLINIC | Facility: CLINIC | Age: 49
End: 2024-10-01
Payer: OTHER GOVERNMENT

## 2024-10-01 DIAGNOSIS — Z23 NEEDS FLU SHOT: Primary | ICD-10-CM

## 2024-10-01 PROCEDURE — 90471 IMMUNIZATION ADMIN: CPT | Performed by: NURSE PRACTITIONER

## 2024-10-01 PROCEDURE — 90656 IIV3 VACC NO PRSV 0.5 ML IM: CPT | Performed by: NURSE PRACTITIONER

## 2024-12-16 ENCOUNTER — TELEPHONE (OUTPATIENT)
Dept: FAMILY MEDICINE CLINIC | Facility: CLINIC | Age: 49
End: 2024-12-16
Payer: OTHER GOVERNMENT

## 2025-06-27 NOTE — PROGRESS NOTES
Patient Name: Morris Nichols  : 1975   MRN: [id number]     Chief Complaint: Annual physical exam    History of Present Illness: Morris Nichols is a 50 y.o. male who is here today for annual physical exam  Also follow up for hyperlipidemia, vitamin d deficiency, allergic rhinitis and ED     DOT physical in 2025     Labs-2023  Colonoscopy-      Non smoker  No etoh   Last eye exam 2024  Last dental exam 2024  Exercising few days per week     FH father HTN, MI before age 40 ,  at age 53 from cancer colon and prostate    Subjective      Review of Systems:   Review of Systems   Constitutional:  Negative for chills, diaphoresis, fatigue and fever.   HENT:  Negative for congestion and sore throat.    Respiratory:  Negative for cough.    Cardiovascular:  Negative for chest pain.   Gastrointestinal:  Negative for abdominal pain, nausea and vomiting.   Genitourinary:  Negative for dysuria.   Musculoskeletal:  Negative for myalgias and neck pain.   Skin:  Negative for rash.   Neurological:  Negative for weakness, numbness and headaches.        Past Medical History:   Past Medical History:   Diagnosis Date    Allergies     Anxiety     Arthritis     Depression     H/O hernia repair        Past Surgical History:   Past Surgical History:   Procedure Laterality Date    COLONOSCOPY      COLONOSCOPY N/A 2021    Procedure: COLONOSCOPY with polypectomy, snare;  Surgeon: Cecy Gibson MD;  Location: [facility] ENDOSCOPY;  Service: Gastroenterology;  Laterality: N/A;  colon polyps,hemorrhoids    INGUINAL HERNIA REPAIR      VESICOSTOMY         Family History:   Family History   Problem Relation Age of Onset    Stroke Other     Heart disease Other     Cancer Other         unspecified    Colon cancer Father     Malig Hyperthermia Neg Hx        Social History:   Social History     Socioeconomic History    Marital status:    Tobacco Use    Smoking status: Former    Smokeless  "tobacco: Never   Vaping Use    Vaping status: Never Used   Substance and Sexual Activity    Alcohol use: Yes     Alcohol/week: 1.0 standard drink of alcohol     Types: 1 Shots of liquor per week     Comment: Rarely     Drug use: Never    Sexual activity: Defer       Medications:     Current Outpatient Medications:     cholecalciferol (VITAMIN D3) 25 MCG (1000 UT) tablet, Take 2 tablets by mouth Daily., Disp: 180 tablet, Rfl: 3    sildenafil (VIAGRA) 100 MG tablet, Take 1 tablet by mouth As Needed for Erectile Dysfunction., Disp: 30 tablet, Rfl: 3    Allergies:   Allergies   Allergen Reactions    Meloxicam Palpitations           Objective     Physical Exam:  Vital Signs:   Vitals:    07/02/25 0750   BP: 147/90   Pulse: 73   Temp: 97.8 °F (36.6 °C)   SpO2: 97%   Weight: 98.8 kg (217 lb 12.8 oz)   Height: 172.7 cm (68\")   PainSc: 0-No pain     Body mass index is 33.12 kg/m².           Physical Exam  HENT:      Right Ear: Tympanic membrane normal.      Left Ear: Tympanic membrane normal.      Nose: Nose normal.      Mouth/Throat:      Mouth: Mucous membranes are moist.   Eyes:      Conjunctiva/sclera: Conjunctivae normal.   Neck:      Thyroid: No thyroid tenderness.      Vascular: No carotid bruit.   Cardiovascular:      Rate and Rhythm: Normal rate and regular rhythm.      Heart sounds: Normal heart sounds. No murmur heard.  Pulmonary:      Effort: Pulmonary effort is normal.      Breath sounds: Normal breath sounds.   Abdominal:      General: Bowel sounds are normal.      Palpations: Abdomen is soft.   Musculoskeletal:      Right lower leg: No edema.      Left lower leg: No edema.   Skin:     General: Skin is warm and dry.   Neurological:      Mental Status: He is alert.   Psychiatric:         Mood and Affect: Mood normal.         Behavior: Behavior normal.             Assessment / Plan      Assessment/Plan:   Diagnoses and all orders for this visit:    1. Annual physical exam (Primary)  -     CBC Auto " "Differential    2. Mixed hyperlipidemia  -     Comprehensive Metabolic Panel  -     Lipid Panel    3. Vitamin D deficiency  -     Vitamin D,25-Hydroxy  -     cholecalciferol (VITAMIN D3) 25 MCG (1000 UT) tablet; Take 2 tablets by mouth Daily.  Dispense: 180 tablet; Refill: 3    4. Erectile dysfunction, unspecified erectile dysfunction type  -     sildenafil (VIAGRA) 100 MG tablet; Take 1 tablet by mouth As Needed for Erectile Dysfunction.  Dispense: 30 tablet; Refill: 3    5. Screening for thyroid disorder  -     TSH    6. Screening PSA (prostate specific antigen)  -     PSA Screen       Annual physical exam immunizations screening reviewed with patient recommend exercise 30 minutes daily avoiding alcohol and smoking recommend routine dental and eye exams patient is up-to-date on colon cancer screening   Hyperlipidemia obtain lipid   Vitamin D deficiency will provide refill once daily supplementation obtain labs monitor  Erectile dysfunction we will provide a refill of Viagra      Follow Up:   Return in about 1 year (around 7/2/2026).    MIREYA Saavedra    \"Please note that portions of this note were completed with a voice recognition program.\"     "

## 2025-07-02 ENCOUNTER — OFFICE VISIT (OUTPATIENT)
Dept: FAMILY MEDICINE CLINIC | Facility: CLINIC | Age: 50
End: 2025-07-02
Payer: OTHER GOVERNMENT

## 2025-07-02 VITALS
WEIGHT: 217.8 LBS | BODY MASS INDEX: 33.01 KG/M2 | OXYGEN SATURATION: 97 % | HEIGHT: 68 IN | SYSTOLIC BLOOD PRESSURE: 131 MMHG | DIASTOLIC BLOOD PRESSURE: 72 MMHG | HEART RATE: 73 BPM | TEMPERATURE: 97.8 F

## 2025-07-02 DIAGNOSIS — Z00.00 ANNUAL PHYSICAL EXAM: Primary | ICD-10-CM

## 2025-07-02 DIAGNOSIS — E55.9 VITAMIN D DEFICIENCY: ICD-10-CM

## 2025-07-02 DIAGNOSIS — N52.9 ERECTILE DYSFUNCTION, UNSPECIFIED ERECTILE DYSFUNCTION TYPE: ICD-10-CM

## 2025-07-02 DIAGNOSIS — Z12.5 SCREENING PSA (PROSTATE SPECIFIC ANTIGEN): ICD-10-CM

## 2025-07-02 DIAGNOSIS — Z13.29 SCREENING FOR THYROID DISORDER: ICD-10-CM

## 2025-07-02 DIAGNOSIS — E78.2 MIXED HYPERLIPIDEMIA: ICD-10-CM

## 2025-07-02 LAB
25(OH)D3 SERPL-MCNC: 43.5 NG/ML (ref 30–100)
ALBUMIN SERPL-MCNC: 4.1 G/DL (ref 3.5–5.2)
ALBUMIN/GLOB SERPL: 1.5 G/DL
ALP SERPL-CCNC: 92 U/L (ref 39–117)
ALT SERPL W P-5'-P-CCNC: 33 U/L (ref 1–41)
ANION GAP SERPL CALCULATED.3IONS-SCNC: 10.7 MMOL/L (ref 5–15)
AST SERPL-CCNC: 29 U/L (ref 1–40)
BASOPHILS # BLD AUTO: 0.03 10*3/MM3 (ref 0–0.2)
BASOPHILS NFR BLD AUTO: 0.5 % (ref 0–1.5)
BILIRUB SERPL-MCNC: 0.4 MG/DL (ref 0–1.2)
BUN SERPL-MCNC: 10 MG/DL (ref 6–20)
BUN/CREAT SERPL: 9.4 (ref 7–25)
CALCIUM SPEC-SCNC: 9.1 MG/DL (ref 8.6–10.5)
CHLORIDE SERPL-SCNC: 105 MMOL/L (ref 98–107)
CHOLEST SERPL-MCNC: 154 MG/DL (ref 0–200)
CO2 SERPL-SCNC: 24.3 MMOL/L (ref 22–29)
CREAT SERPL-MCNC: 1.06 MG/DL (ref 0.76–1.27)
DEPRECATED RDW RBC AUTO: 39.7 FL (ref 37–54)
EGFRCR SERPLBLD CKD-EPI 2021: 86 ML/MIN/1.73
EOSINOPHIL # BLD AUTO: 0.45 10*3/MM3 (ref 0–0.4)
EOSINOPHIL NFR BLD AUTO: 7.8 % (ref 0.3–6.2)
ERYTHROCYTE [DISTWIDTH] IN BLOOD BY AUTOMATED COUNT: 12.9 % (ref 12.3–15.4)
GLOBULIN UR ELPH-MCNC: 2.8 GM/DL
GLUCOSE SERPL-MCNC: 90 MG/DL (ref 65–99)
HCT VFR BLD AUTO: 43.1 % (ref 37.5–51)
HDLC SERPL-MCNC: 31 MG/DL (ref 40–60)
HGB BLD-MCNC: 14.7 G/DL (ref 13–17.7)
IMM GRANULOCYTES # BLD AUTO: 0.01 10*3/MM3 (ref 0–0.05)
IMM GRANULOCYTES NFR BLD AUTO: 0.2 % (ref 0–0.5)
LDLC SERPL CALC-MCNC: 104 MG/DL (ref 0–100)
LDLC/HDLC SERPL: 3.33 {RATIO}
LYMPHOCYTES # BLD AUTO: 1.89 10*3/MM3 (ref 0.7–3.1)
LYMPHOCYTES NFR BLD AUTO: 32.9 % (ref 19.6–45.3)
MCH RBC QN AUTO: 29.3 PG (ref 26.6–33)
MCHC RBC AUTO-ENTMCNC: 34.1 G/DL (ref 31.5–35.7)
MCV RBC AUTO: 86 FL (ref 79–97)
MONOCYTES # BLD AUTO: 0.37 10*3/MM3 (ref 0.1–0.9)
MONOCYTES NFR BLD AUTO: 6.4 % (ref 5–12)
NEUTROPHILS NFR BLD AUTO: 3 10*3/MM3 (ref 1.7–7)
NEUTROPHILS NFR BLD AUTO: 52.2 % (ref 42.7–76)
NRBC BLD AUTO-RTO: 0 /100 WBC (ref 0–0.2)
PLATELET # BLD AUTO: 342 10*3/MM3 (ref 140–450)
PMV BLD AUTO: 9.4 FL (ref 6–12)
POTASSIUM SERPL-SCNC: 4 MMOL/L (ref 3.5–5.2)
PROT SERPL-MCNC: 6.9 G/DL (ref 6–8.5)
PSA SERPL-MCNC: 0.94 NG/ML (ref 0–4)
RBC # BLD AUTO: 5.01 10*6/MM3 (ref 4.14–5.8)
SODIUM SERPL-SCNC: 140 MMOL/L (ref 136–145)
TRIGL SERPL-MCNC: 99 MG/DL (ref 0–150)
TSH SERPL DL<=0.05 MIU/L-ACNC: 2.98 UIU/ML (ref 0.27–4.2)
VLDLC SERPL-MCNC: 19 MG/DL (ref 5–40)
WBC NRBC COR # BLD AUTO: 5.75 10*3/MM3 (ref 3.4–10.8)

## 2025-07-02 PROCEDURE — 80053 COMPREHEN METABOLIC PANEL: CPT | Performed by: NURSE PRACTITIONER

## 2025-07-02 PROCEDURE — 80061 LIPID PANEL: CPT | Performed by: NURSE PRACTITIONER

## 2025-07-02 PROCEDURE — 85025 COMPLETE CBC W/AUTO DIFF WBC: CPT | Performed by: NURSE PRACTITIONER

## 2025-07-02 PROCEDURE — 84443 ASSAY THYROID STIM HORMONE: CPT | Performed by: NURSE PRACTITIONER

## 2025-07-02 PROCEDURE — G0103 PSA SCREENING: HCPCS | Performed by: NURSE PRACTITIONER

## 2025-07-02 PROCEDURE — 82306 VITAMIN D 25 HYDROXY: CPT | Performed by: NURSE PRACTITIONER

## 2025-07-02 RX ORDER — SILDENAFIL 100 MG/1
100 TABLET, FILM COATED ORAL AS NEEDED
Qty: 30 TABLET | Refills: 3 | Status: SHIPPED | OUTPATIENT
Start: 2025-07-02

## 2025-07-02 RX ORDER — CHOLECALCIFEROL (VITAMIN D3) 25 MCG
2000 TABLET ORAL DAILY
Qty: 180 TABLET | Refills: 3 | Status: SHIPPED | OUTPATIENT
Start: 2025-07-02

## 2025-07-08 ENCOUNTER — RESULTS FOLLOW-UP (OUTPATIENT)
Dept: FAMILY MEDICINE CLINIC | Facility: CLINIC | Age: 50
End: 2025-07-08
Payer: OTHER GOVERNMENT

## 2025-07-08 NOTE — TELEPHONE ENCOUNTER
"Relay     \"LDL cholesterol is improving continue to cut out fried foods red meat pork products cheese increasing fruits vegetables whole grains overall increasing fiber in diet exercise 30 minutes daily weight loss  Vitamin D normal  Thyroid normal  PSA normal  Chemistry panel normal\"    "

## (undated) DEVICE — SNAR POLYP CAPTIFLEX XS/OVL 11X2.4MM 240CM 1P/U

## (undated) DEVICE — COLON KIT: Brand: MEDLINE INDUSTRIES, INC.

## (undated) DEVICE — Device: Brand: DEFENDO AIR/WATER/SUCTION AND BIOPSY VALVE

## (undated) DEVICE — THE SINGLE USE ETRAP – POLYP TRAP IS USED FOR SUCTION RETRIEVAL OF ENDOSCOPICALLY REMOVED POLYPS.: Brand: ETRAP

## (undated) DEVICE — SOL IRRG H2O PL/BG 1000ML STRL